# Patient Record
Sex: FEMALE
[De-identification: names, ages, dates, MRNs, and addresses within clinical notes are randomized per-mention and may not be internally consistent; named-entity substitution may affect disease eponyms.]

---

## 2017-02-28 ENCOUNTER — HOSPITAL ENCOUNTER (OUTPATIENT)
Dept: HOSPITAL 53 - M LAB REF | Age: 57
End: 2017-02-28
Attending: NURSE PRACTITIONER
Payer: MEDICAID

## 2017-02-28 DIAGNOSIS — Z01.89: Primary | ICD-10-CM

## 2017-02-28 DIAGNOSIS — R94.5: ICD-10-CM

## 2017-02-28 LAB
FERRITIN SERPL-MCNC: 165 NG/ML (ref 8–252)
IRON SATN MFR SERPL: 36.9 % (ref 13.2–37.4)
TIBC SERPL-MCNC: 290 UG/DL (ref 250–450)
VIT B12 SERPL-MCNC: 425 PG/ML (ref 247–911)

## 2017-03-28 ENCOUNTER — HOSPITAL ENCOUNTER (OUTPATIENT)
Dept: HOSPITAL 53 - M LAB REF | Age: 57
End: 2017-03-28
Attending: NURSE PRACTITIONER
Payer: MEDICAID

## 2017-03-28 DIAGNOSIS — R94.5: ICD-10-CM

## 2017-03-28 DIAGNOSIS — I10: ICD-10-CM

## 2017-03-28 DIAGNOSIS — E55.9: Primary | ICD-10-CM

## 2017-03-28 LAB
ALBUMIN SERPL BCG-MCNC: 4.2 GM/DL (ref 3.2–5.2)
ALBUMIN/GLOB SERPL: 1.24 {RATIO} (ref 1–1.93)
ALP SERPL-CCNC: 94 U/L (ref 45–117)
ALT SERPL W P-5'-P-CCNC: 23 U/L (ref 12–78)
ANION GAP SERPL CALC-SCNC: 12 MEQ/L (ref 8–16)
AST SERPL-CCNC: 21 U/L (ref 15–37)
BILIRUB SERPL-MCNC: 0.5 MG/DL (ref 0.2–1)
BUN SERPL-MCNC: 6 MG/DL (ref 7–18)
CALCIUM SERPL-MCNC: 9.7 MG/DL (ref 8.5–10.1)
CHLORIDE SERPL-SCNC: 95 MEQ/L (ref 98–107)
CO2 SERPL-SCNC: 22 MEQ/L (ref 21–32)
CREAT SERPL-MCNC: 0.58 MG/DL (ref 0.55–1.02)
GFR SERPL CREATININE-BSD FRML MDRD: > 60 ML/MIN/{1.73_M2} (ref 51–?)
GLUCOSE SERPL-MCNC: 92 MG/DL (ref 70–105)
POTASSIUM SERPL-SCNC: 4.5 MEQ/L (ref 3.5–5.1)
PROT SERPL-MCNC: 7.6 GM/DL (ref 6.4–8.2)
SODIUM SERPL-SCNC: 129 MEQ/L (ref 136–145)

## 2019-04-29 ENCOUNTER — HOSPITAL ENCOUNTER (OUTPATIENT)
Dept: HOSPITAL 53 - M LAB REF | Age: 59
End: 2019-04-29
Attending: NURSE PRACTITIONER
Payer: MEDICAID

## 2019-04-29 DIAGNOSIS — I10: ICD-10-CM

## 2019-04-29 DIAGNOSIS — D75.89: ICD-10-CM

## 2019-04-29 DIAGNOSIS — J44.9: Primary | ICD-10-CM

## 2019-04-29 DIAGNOSIS — R94.5: ICD-10-CM

## 2019-04-29 DIAGNOSIS — R63.4: ICD-10-CM

## 2019-04-29 LAB
ALBUMIN SERPL BCG-MCNC: 4.1 GM/DL (ref 3.2–5.2)
ALT SERPL W P-5'-P-CCNC: 17 U/L (ref 12–78)
BASOPHILS # BLD AUTO: 0.1 10^3/UL (ref 0–0.2)
BASOPHILS NFR BLD AUTO: 0.9 % (ref 0–1)
BILIRUB SERPL-MCNC: 0.5 MG/DL (ref 0.2–1)
BUN SERPL-MCNC: 5 MG/DL (ref 7–18)
CALCIUM SERPL-MCNC: 8.9 MG/DL (ref 8.5–10.1)
CHLORIDE SERPL-SCNC: 101 MEQ/L (ref 98–107)
CHOLEST SERPL-MCNC: 242 MG/DL (ref ?–200)
CHOLEST/HDLC SERPL: 4.03 {RATIO} (ref ?–5)
CO2 SERPL-SCNC: 25 MEQ/L (ref 21–32)
CREAT SERPL-MCNC: 0.67 MG/DL (ref 0.55–1.3)
EOSINOPHIL # BLD AUTO: 0.1 10^3/UL (ref 0–0.5)
EOSINOPHIL NFR BLD AUTO: 1.3 % (ref 0–3)
GFR SERPL CREATININE-BSD FRML MDRD: > 60 ML/MIN/{1.73_M2} (ref 51–?)
GLUCOSE SERPL-MCNC: 95 MG/DL (ref 70–100)
HCT VFR BLD AUTO: 44.8 % (ref 36–47)
HDLC SERPL-MCNC: 60 MG/DL (ref 40–?)
HGB BLD-MCNC: 15.6 G/DL (ref 12–15.5)
LDLC SERPL CALC-MCNC: 162 MG/DL (ref ?–100)
LYMPHOCYTES # BLD AUTO: 2.4 10^3/UL (ref 1.5–4.5)
LYMPHOCYTES NFR BLD AUTO: 27.2 % (ref 24–44)
MCH RBC QN AUTO: 36 PG (ref 27–33)
MCHC RBC AUTO-ENTMCNC: 34.8 G/DL (ref 32–36.5)
MCV RBC AUTO: 103.5 FL (ref 80–96)
MONOCYTES # BLD AUTO: 0.7 10^3/UL (ref 0–0.8)
MONOCYTES NFR BLD AUTO: 7.5 % (ref 0–5)
NEUTROPHILS # BLD AUTO: 5.5 10^3/UL (ref 1.8–7.7)
NEUTROPHILS NFR BLD AUTO: 62.8 % (ref 36–66)
NONHDLC SERPL-MCNC: 182 MG/DL
PLATELET # BLD AUTO: 237 10^3/UL (ref 150–450)
POTASSIUM SERPL-SCNC: 4 MEQ/L (ref 3.5–5.1)
PROT SERPL-MCNC: 7.3 GM/DL (ref 6.4–8.2)
RBC # BLD AUTO: 4.33 10^6/UL (ref 4–5.4)
SODIUM SERPL-SCNC: 135 MEQ/L (ref 136–145)
TRIGL SERPL-MCNC: 100 MG/DL (ref ?–150)
WBC # BLD AUTO: 8.7 10^3/UL (ref 4–10)

## 2020-12-15 ENCOUNTER — HOSPITAL ENCOUNTER (EMERGENCY)
Dept: HOSPITAL 53 - M ED | Age: 60
LOS: 1 days | Discharge: ANOTHER HOSPITAL | End: 2020-12-16
Payer: MEDICAID

## 2020-12-15 VITALS — HEIGHT: 62 IN | BODY MASS INDEX: 17.34 KG/M2 | WEIGHT: 94.2 LBS

## 2020-12-15 DIAGNOSIS — E87.1: ICD-10-CM

## 2020-12-15 DIAGNOSIS — F17.200: ICD-10-CM

## 2020-12-15 DIAGNOSIS — I70.235: Primary | ICD-10-CM

## 2020-12-15 DIAGNOSIS — J44.9: ICD-10-CM

## 2020-12-15 DIAGNOSIS — I10: ICD-10-CM

## 2020-12-15 DIAGNOSIS — Z79.899: ICD-10-CM

## 2020-12-15 LAB
ALBUMIN SERPL BCG-MCNC: 3 GM/DL (ref 3.2–5.2)
ALT SERPL W P-5'-P-CCNC: 24 U/L (ref 12–78)
APTT BLD: 36.7 SECONDS (ref 24.2–38.5)
BASOPHILS # BLD AUTO: 0.1 10^3/UL (ref 0–0.2)
BASOPHILS NFR BLD AUTO: 0.3 % (ref 0–1)
BILIRUB CONJ SERPL-MCNC: 0.2 MG/DL (ref 0–0.2)
BILIRUB SERPL-MCNC: 0.7 MG/DL (ref 0.2–1)
BUN SERPL-MCNC: 8 MG/DL (ref 7–18)
BUN SERPL-MCNC: 8 MG/DL (ref 7–18)
CALCIUM SERPL-MCNC: 8.4 MG/DL (ref 8.8–10.2)
CALCIUM SERPL-MCNC: 8.5 MG/DL (ref 8.8–10.2)
CHLORIDE SERPL-SCNC: 81 MEQ/L (ref 98–107)
CHLORIDE SERPL-SCNC: 84 MEQ/L (ref 98–107)
CO2 SERPL-SCNC: 20 MEQ/L (ref 21–32)
CO2 SERPL-SCNC: 22 MEQ/L (ref 21–32)
CREAT SERPL-MCNC: 0.84 MG/DL (ref 0.55–1.3)
CREAT SERPL-MCNC: 0.86 MG/DL (ref 0.55–1.3)
CREAT UR-MCNC: 22.2 MG/DL
CRP SERPL-MCNC: 3.64 MG/DL (ref 0–0.3)
EOSINOPHIL # BLD AUTO: 0 10^3/UL (ref 0–0.5)
EOSINOPHIL NFR BLD AUTO: 0.2 % (ref 0–3)
ERYTHROCYTE [SEDIMENTATION RATE] IN BLOOD BY WESTERGREN METHOD: 17 MM/HR (ref 0–30)
GFR SERPL CREATININE-BSD FRML MDRD: > 60 ML/MIN/{1.73_M2} (ref 45–?)
GFR SERPL CREATININE-BSD FRML MDRD: > 60 ML/MIN/{1.73_M2} (ref 45–?)
GLUCOSE SERPL-MCNC: 101 MG/DL (ref 70–100)
GLUCOSE SERPL-MCNC: 94 MG/DL (ref 70–100)
HCT VFR BLD AUTO: 35.9 % (ref 36–47)
HGB BLD-MCNC: 13.6 G/DL (ref 12–15.5)
INR PPP: 0.81
LYMPHOCYTES # BLD AUTO: 1.5 10^3/UL (ref 1.5–5)
LYMPHOCYTES NFR BLD AUTO: 8.1 % (ref 24–44)
MCH RBC QN AUTO: 33.5 PG (ref 27–33)
MCHC RBC AUTO-ENTMCNC: 37.9 G/DL (ref 32–36.5)
MCV RBC AUTO: 88.4 FL (ref 80–96)
MONOCYTES # BLD AUTO: 0.8 10^3/UL (ref 0–0.8)
MONOCYTES NFR BLD AUTO: 4.1 % (ref 0–5)
NEUTROPHILS # BLD AUTO: 15.6 10^3/UL (ref 1.5–8.5)
NEUTROPHILS NFR BLD AUTO: 85.5 % (ref 36–66)
NT-PRO BNP: 424 PG/ML (ref ?–125)
OSMOLALITY SERPL: 224 MOSM/KG (ref 275–295)
OSMOLALITY UR: 200 MOSM/KG (ref 500–800)
PLATELET # BLD AUTO: 272 10^3/UL (ref 150–450)
POTASSIUM SERPL-SCNC: 4.5 MEQ/L (ref 3.5–5.1)
POTASSIUM SERPL-SCNC: 5.4 MEQ/L (ref 3.5–5.1)
PROT SERPL-MCNC: 6.4 GM/DL (ref 6.4–8.2)
PROTHROMBIN TIME: 11.4 SECONDS (ref 12.5–14.3)
RBC # BLD AUTO: 4.06 10^6/UL (ref 4–5.4)
SODIUM SERPL-SCNC: 109 MEQ/L (ref 136–145)
SODIUM SERPL-SCNC: 113 MEQ/L (ref 136–145)
SODIUM UR-SCNC: 17 MEQ/L
T4 FREE SERPL-MCNC: 1.17 NG/DL (ref 0.76–1.46)
TSH SERPL DL<=0.005 MIU/L-ACNC: 1.84 UIU/ML (ref 0.36–3.74)
WBC # BLD AUTO: 18.2 10^3/UL (ref 4–10)

## 2020-12-15 PROCEDURE — 85025 COMPLETE CBC W/AUTO DIFF WBC: CPT

## 2020-12-15 PROCEDURE — 83935 ASSAY OF URINE OSMOLALITY: CPT

## 2020-12-15 PROCEDURE — 83930 ASSAY OF BLOOD OSMOLALITY: CPT

## 2020-12-15 PROCEDURE — 84439 ASSAY OF FREE THYROXINE: CPT

## 2020-12-15 PROCEDURE — 84300 ASSAY OF URINE SODIUM: CPT

## 2020-12-15 PROCEDURE — 73706 CT ANGIO LWR EXTR W/O&W/DYE: CPT

## 2020-12-15 PROCEDURE — 85610 PROTHROMBIN TIME: CPT

## 2020-12-15 PROCEDURE — 80048 BASIC METABOLIC PNL TOTAL CA: CPT

## 2020-12-15 PROCEDURE — 73630 X-RAY EXAM OF FOOT: CPT

## 2020-12-15 PROCEDURE — 83605 ASSAY OF LACTIC ACID: CPT

## 2020-12-15 PROCEDURE — 84443 ASSAY THYROID STIM HORMONE: CPT

## 2020-12-15 PROCEDURE — 80076 HEPATIC FUNCTION PANEL: CPT

## 2020-12-15 PROCEDURE — 99284 EMERGENCY DEPT VISIT MOD MDM: CPT

## 2020-12-15 PROCEDURE — 82570 ASSAY OF URINE CREATININE: CPT

## 2020-12-15 PROCEDURE — 83880 ASSAY OF NATRIURETIC PEPTIDE: CPT

## 2020-12-15 PROCEDURE — 93005 ELECTROCARDIOGRAM TRACING: CPT

## 2020-12-15 PROCEDURE — 71046 X-RAY EXAM CHEST 2 VIEWS: CPT

## 2020-12-15 PROCEDURE — 85730 THROMBOPLASTIN TIME PARTIAL: CPT

## 2020-12-15 PROCEDURE — 85652 RBC SED RATE AUTOMATED: CPT

## 2020-12-15 PROCEDURE — 96361 HYDRATE IV INFUSION ADD-ON: CPT

## 2020-12-15 PROCEDURE — 86140 C-REACTIVE PROTEIN: CPT

## 2020-12-15 PROCEDURE — 96365 THER/PROPH/DIAG IV INF INIT: CPT

## 2020-12-15 NOTE — REP
INDICATION:

chronic wound r/o osteo, sq emphysema



COMPARISON:

None.



TECHNIQUE:

There are four views:



FINDINGS:

There is diffuse circumferential forefoot soft tissue swelling.  No air or gas bubbles

are identified within the swollen soft tissues.

There are no lytic, blastic or destructive skeletal changes to suggest osteomyelitis.

Mineralization and joint spaces are otherwise unremarkable.



IMPRESSION:

No plain film evidence of osteomyelitis.  Diffuse forefoot soft tissue swelling.





<Electronically signed by Zander Colon > 12/15/20 [id number]

## 2020-12-15 NOTE — REP
INDICATION:

hyponatremia, copd r/o pulmonary mass.



COMPARISON:

05/30/2006.



TECHNIQUE:

Upright PA and lateral chest.



FINDINGS:

The lung fields are clear.

Cardiac size is normal.

The lani, mediastinum and skeletal structures are unremarkable.





IMPRESSION:

Essentially negative PA and lateral chest





<Electronically signed by Zander Colon > 12/15/20 2014

## 2020-12-16 VITALS — SYSTOLIC BLOOD PRESSURE: 120 MMHG | DIASTOLIC BLOOD PRESSURE: 77 MMHG

## 2020-12-16 NOTE — ECGEPIP
[facility] - ED

                                       

                                       Test Date:    2020-12-15

Pat Name:     SUE PORTER           Department:   

Patient ID:   [id number]                 Room:         -

Gender:       Female                   Technician:   [email or web address]

:          1960               Requested By: SHRAON CRAWFORD PA-C.

Order Number: [id number]     Reading MD:   Freddy Woodard

                                 Measurements

Intervals                              Axis          

Rate:         89                       P:            76

WV:           175                      QRS:          53

QRSD:         85                       T:            71

QT:           370                                    

QTc:          451                                    

                           Interpretive Statements

SINUS RHYTHM

LOW QRS VOLTAGE IN PRECORDIAL LEADS

ANTEROSEPTAL MYOCARDIAL INFARCTION, OF INDETERMINATE AGE

ABNORMAL T, CONSIDER ISCHEMIA, ANTERIOR LEADS

NO PRIORS FOR COMPARISON

Electronically Signed on 2020 7:55:53 EST by Freddy Woodard

## 2020-12-16 NOTE — REP
INDICATION:

right foot ischemia. Repeat dictation: Preliminary report is provided at the time of

the exam by v RAD.



COMPARISON:

None.



TECHNIQUE:

100 mL of intravenous Isovue 370 is administered.  Helical scanning is acquired.  3 mm

axial images re-formatted.  Coronal and sagittal MPR, curved MPR, and MIP images are

generated.  Surface rendered 3D images are generated and viewed in a rotational format.



FINDINGS:

The renal artery origins are not included in the scan plane.  The lower pole the right

kidney suggests right renal atrophy.  There appear to be 2 left renal arteries.

Similarly, the celiac axis and the origin of the SMA are above the top of the imaging

field of view.  The SMA is patent.  NIXON origin is calcific but patent.  The infrarenal

abdominal aorta is normal in caliber with calcific plaquing no high-grade stenosis.

There is bilateral common iliac artery disease with 50% stenosis of the origin of the

left common iliac artery.  There is multifocal right external iliac artery stenosis

with the distal external iliac artery approximately 75% stenotic.  There is multifocal

disease involving the left external iliac artery with 80-90% stenosis.  The internal

iliac arteries appear to be occluded bilaterally.  Distal external iliac arteries are

small.  Common femoral arteries show heavy calcification and plaquing with stenosis.

Profundal femoral arteries are patent bilaterally.



On the right there is multifocal calcific plaquing in the superficial femoral artery

with multiple stenoses.  A short segment occlusion is seen in the right distal SFA to

popliteal artery.  The right distal popliteal artery is reconstituted.  There is

intact peroneal artery on the right.  Multifocal calcification and occlusion seen in

the anterior and posterior tibial arteries on the right.  There is diffuse soft tissue

swelling.  The 8 posterior tibial artery is reconstituted and is seen a patent across

the ankle.  Flow is not observed in the anterior tibial.



On the left, there is extensive calcification and high-grade stenosis at the origin of

the superficial femoral artery and multifocal plaquing and stenoses are seen.  No SFA

occlusion is identified.  The popliteal artery is occluded over a short segment and

the trifurcation vessels are reconstituted.  There appears to be flow in 3 vessels in

the proximal calf with multifocal calcification.  The distal calf shows only the

anterior tibial.  The posterior tibial is not seen across the ankle.



IMPRESSION:

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

SFA and pop and left popliteal artery.  Extensive calcific plaquing.  Calf

trifurcation and calf runoff disease as above.  Diffuse soft tissue swelling right

calf.





<Electronically signed by Jaquan Allen > 12/16/20 [id number]

## 2020-12-17 NOTE — IPN
PODIATRY PROGRESS NOTE



DATE:  9/15/2020 at approximately 6:30 p.m. 



CHIEF COMPLAINT:  Patient seen today for evaluation at the Emergency Room for

an ischemic right foot.



HISTORY OF PRESENT ILLNESS:  Patient states that her foot gives her pain only

when she ambulates. It has been painful for approximately a month. She went to

her primary care physician who referred her to the Emergency Room for further

evaluation and she was seen today for evaluation of an ischemic right foot.



PAST MEDICAL HISTORY: Positive for:

1.  Dementia.

2.  Alcohol abuse.



PHYSICAL EXAMINATION: The patient is alert and oriented times 3. She states

that her right foot is painful only with ambulation. She has noticed that she

has had some discharge from a wound on the top of her right foot. 



Evaluation of her foot reveals an area of superficial ulcerations/sloughing of

the dorsal skin of the right foot. This encompasses the entire forefoot; the

fifth toe has considerable ischemic changes. The other digits are pale, but no

ulceration. The plantar aspect of the foot is intact. Palpation of the dorsal

aspect of the foot reveals pitting edema. The skin is not tense. It is not

painful to palpation. The dorsalis pedis and posterior tibial pulses are not

palpable on the right side. Popliteal pulse is not palpable as well. The left

foot has a normal temperature and is not swollen. The right foot has normal

temperature down to the ankle; from the ankle distally the foot is cool. CT

angiogram was ordered for the right foot to evaluate her vascular status. The

impression is extensive atherosclerosis with aortoiliac disease bilateral,

occlusion of the right distal superficial femoral artery and popliteal artery. 



ASSESSMENT: Ischemic changes right foot. 



PLAN: Since the patient's foot has no abscess, it does not appear to have

compartment syndrome, but ischemic right changes I would recommend referral to

a facility that presently has vascular capability, our vascular surgeon will be

back next week. Patient's questions were answered. 



Thank you for this consultation.

## 2021-09-30 ENCOUNTER — HOSPITAL ENCOUNTER (OUTPATIENT)
Dept: HOSPITAL 53 - M PT | Age: 61
End: 2021-09-30
Attending: NURSE PRACTITIONER
Payer: MEDICAID

## 2021-09-30 DIAGNOSIS — Z89.611: Primary | ICD-10-CM
